# Patient Record
Sex: FEMALE | Race: WHITE | NOT HISPANIC OR LATINO | URBAN - METROPOLITAN AREA
[De-identification: names, ages, dates, MRNs, and addresses within clinical notes are randomized per-mention and may not be internally consistent; named-entity substitution may affect disease eponyms.]

---

## 2023-10-25 ENCOUNTER — OFFICE (AMBULATORY)
Dept: URBAN - METROPOLITAN AREA CLINIC 76 | Facility: CLINIC | Age: 58
End: 2023-10-25

## 2023-10-25 VITALS
HEIGHT: 64 IN | HEART RATE: 80 BPM | DIASTOLIC BLOOD PRESSURE: 69 MMHG | WEIGHT: 220 LBS | SYSTOLIC BLOOD PRESSURE: 100 MMHG | OXYGEN SATURATION: 97 %

## 2023-10-25 DIAGNOSIS — K62.5 HEMORRHAGE OF ANUS AND RECTUM: ICD-10-CM

## 2023-10-25 DIAGNOSIS — K75.81 NONALCOHOLIC STEATOHEPATITIS (NASH): ICD-10-CM

## 2023-10-25 DIAGNOSIS — K59.00 CONSTIPATION, UNSPECIFIED: ICD-10-CM

## 2023-10-25 DIAGNOSIS — K64.9 UNSPECIFIED HEMORRHOIDS: ICD-10-CM

## 2023-10-25 PROCEDURE — 99204 OFFICE O/P NEW MOD 45 MIN: CPT | Performed by: INTERNAL MEDICINE

## 2023-10-25 NOTE — SERVICEHPINOTES
Ms. patricia is a pleasant 58-year-old female who I haven't seen in years.  She has had a change in bowel habits.  She's been having constipation.  She's had improvement with Colace and fiber.  She didn't tolerate MiraLAX and thought it caused a rash.  She's also tried to increase her intake of fluids.  There is no change in medications there is no change in diet.  She says symptoms started in July for no obvious reason.  She is due for screening colonoscopy.  Her grandfather had colon cancer in his 60s so that is not considered a risk factor based on current guidelines.  She'll have a little blood with straining consistent with hemorrhoids.  She's not on a blood thinner.  She's had 3 C-sections.  She says she is lost about 10-12 pounds and has not been trying. 
inga xiong She denies reflux.  There is no dysphagia, odynophagia nausea or vomiting.  There is no melena or hematemesis.  She is not a smoker or drinker.
inga xiong She also has a history of Bazan.  I don't have recent blood work but she tells me her blood works been fine.  I would like to check a fibro-sure.  She is in no acute distress..
inga xiong

## 2023-11-13 ENCOUNTER — OFFICE (AMBULATORY)
Dept: URBAN - METROPOLITAN AREA CLINIC 46 | Facility: CLINIC | Age: 58
End: 2023-11-13

## 2023-11-13 VITALS — HEIGHT: 64 IN

## 2023-11-13 DIAGNOSIS — K75.81 NONALCOHOLIC STEATOHEPATITIS (NASH): ICD-10-CM

## 2023-11-13 PROCEDURE — 76981 USE PARENCHYMA: CPT | Performed by: INTERNAL MEDICINE

## 2023-12-04 VITALS
HEART RATE: 83 BPM | RESPIRATION RATE: 19 BRPM | HEART RATE: 79 BPM | RESPIRATION RATE: 17 BRPM | DIASTOLIC BLOOD PRESSURE: 56 MMHG | RESPIRATION RATE: 27 BRPM | OXYGEN SATURATION: 98 % | HEART RATE: 90 BPM | DIASTOLIC BLOOD PRESSURE: 63 MMHG | HEART RATE: 91 BPM | HEART RATE: 85 BPM | DIASTOLIC BLOOD PRESSURE: 59 MMHG | RESPIRATION RATE: 10 BRPM | SYSTOLIC BLOOD PRESSURE: 121 MMHG | DIASTOLIC BLOOD PRESSURE: 71 MMHG | SYSTOLIC BLOOD PRESSURE: 100 MMHG | SYSTOLIC BLOOD PRESSURE: 135 MMHG | OXYGEN SATURATION: 99 % | SYSTOLIC BLOOD PRESSURE: 122 MMHG | SYSTOLIC BLOOD PRESSURE: 110 MMHG | HEART RATE: 87 BPM | TEMPERATURE: 98.2 F | SYSTOLIC BLOOD PRESSURE: 127 MMHG | DIASTOLIC BLOOD PRESSURE: 62 MMHG | HEART RATE: 89 BPM | SYSTOLIC BLOOD PRESSURE: 98 MMHG | DIASTOLIC BLOOD PRESSURE: 58 MMHG | HEART RATE: 81 BPM | RESPIRATION RATE: 16 BRPM | SYSTOLIC BLOOD PRESSURE: 131 MMHG | HEIGHT: 64 IN | RESPIRATION RATE: 15 BRPM | DIASTOLIC BLOOD PRESSURE: 51 MMHG | RESPIRATION RATE: 9 BRPM | SYSTOLIC BLOOD PRESSURE: 111 MMHG | SYSTOLIC BLOOD PRESSURE: 89 MMHG | DIASTOLIC BLOOD PRESSURE: 105 MMHG | HEART RATE: 82 BPM | DIASTOLIC BLOOD PRESSURE: 90 MMHG | OXYGEN SATURATION: 100 % | DIASTOLIC BLOOD PRESSURE: 44 MMHG | RESPIRATION RATE: 20 BRPM | SYSTOLIC BLOOD PRESSURE: 104 MMHG | WEIGHT: 220 LBS | OXYGEN SATURATION: 96 % | HEART RATE: 86 BPM | DIASTOLIC BLOOD PRESSURE: 69 MMHG | TEMPERATURE: 97.2 F | RESPIRATION RATE: 22 BRPM | OXYGEN SATURATION: 97 %

## 2023-12-07 ENCOUNTER — AMBULATORY SURGICAL CENTER (AMBULATORY)
Dept: URBAN - METROPOLITAN AREA SURGERY 17 | Facility: SURGERY | Age: 58
End: 2023-12-07
Payer: COMMERCIAL

## 2023-12-07 ENCOUNTER — OFFICE (AMBULATORY)
Dept: URBAN - METROPOLITAN AREA PATHOLOGY 4 | Facility: PATHOLOGY | Age: 58
End: 2023-12-07

## 2023-12-07 DIAGNOSIS — D12.2 BENIGN NEOPLASM OF ASCENDING COLON: ICD-10-CM

## 2023-12-07 DIAGNOSIS — D12.8 BENIGN NEOPLASM OF RECTUM: ICD-10-CM

## 2023-12-07 DIAGNOSIS — Z12.11 ENCOUNTER FOR SCREENING FOR MALIGNANT NEOPLASM OF COLON: ICD-10-CM

## 2023-12-07 DIAGNOSIS — D12.5 BENIGN NEOPLASM OF SIGMOID COLON: ICD-10-CM

## 2023-12-07 PROBLEM — K62.1 RECTAL POLYP: Status: ACTIVE | Noted: 2023-12-07

## 2023-12-07 PROBLEM — K63.5 POLYP OF COLON: Status: ACTIVE | Noted: 2023-12-07

## 2023-12-07 LAB
GI HISTOLOGY: A. UNSPECIFIED: (no result)
GI HISTOLOGY: B. UNSPECIFIED: (no result)
GI HISTOLOGY: C. UNSPECIFIED: (no result)
GI HISTOLOGY: PDF REPORT: (no result)

## 2023-12-07 PROCEDURE — 45390 COLONOSCOPY W/RESECTION: CPT | Mod: 33,59 | Performed by: INTERNAL MEDICINE

## 2023-12-07 PROCEDURE — 45385 COLONOSCOPY W/LESION REMOVAL: CPT | Mod: 33 | Performed by: INTERNAL MEDICINE

## 2023-12-07 PROCEDURE — 88305 TISSUE EXAM BY PATHOLOGIST: CPT | Performed by: INTERNAL MEDICINE

## 2023-12-07 PROCEDURE — 45390 COLONOSCOPY W/RESECTION: CPT | Mod: 59,33 | Performed by: INTERNAL MEDICINE

## 2023-12-07 NOTE — SERVICEHPINOTES
Ms. patricia is a pleasant 58-year-old female who I haven't seen in years.  She has had a change in bowel habits.  She's been having constipation.  She's had improvement with Colace and fiber.  She didn't tolerate MiraLAX and thought it caused a rash.  She's also tried to increase her intake of fluids.  There is no change in medications there is no change in diet.  She says symptoms started in July for no obvious reason.  She is due for screening colonoscopy.  Her grandfather had colon cancer in his 60s so that is not considered a risk factor based on current guidelines.  She'll have a little blood with straining consistent with hemorrhoids.  She's not on a blood thinner.  She's had 3 C-sections.  She says she is lost about 10-12 pounds and has not been trying.She denies reflux.  There is no dysphagia, odynophagia nausea or vomiting.  There is no melena or hematemesis.  She is not a smoker or drinker.She also has a history of Bazan.  I don't have recent blood work but she tells me her blood works been fine.  I would like to check a fibro-sure.  She is in no acute distress..